# Patient Record
Sex: FEMALE | Race: WHITE | NOT HISPANIC OR LATINO | Employment: OTHER | URBAN - METROPOLITAN AREA
[De-identification: names, ages, dates, MRNs, and addresses within clinical notes are randomized per-mention and may not be internally consistent; named-entity substitution may affect disease eponyms.]

---

## 2017-09-21 ENCOUNTER — HOSPITAL ENCOUNTER (OUTPATIENT)
Dept: RADIOLOGY | Facility: MEDICAL CENTER | Age: 62
End: 2017-09-21
Attending: FAMILY MEDICINE
Payer: COMMERCIAL

## 2017-09-21 DIAGNOSIS — Z12.31 ENCOUNTER FOR SCREENING MAMMOGRAM FOR MALIGNANT NEOPLASM OF BREAST: ICD-10-CM

## 2017-09-21 PROCEDURE — G0202 SCR MAMMO BI INCL CAD: HCPCS

## 2019-04-16 ENCOUNTER — HOSPITAL ENCOUNTER (OUTPATIENT)
Dept: LAB | Facility: MEDICAL CENTER | Age: 64
End: 2019-04-16
Attending: FAMILY MEDICINE
Payer: COMMERCIAL

## 2019-04-16 LAB
ALBUMIN SERPL BCP-MCNC: 4.4 G/DL (ref 3.2–4.9)
ALBUMIN/GLOB SERPL: 1.6 G/DL
ALP SERPL-CCNC: 67 U/L (ref 30–99)
ALT SERPL-CCNC: 29 U/L (ref 2–50)
ANION GAP SERPL CALC-SCNC: 7 MMOL/L (ref 0–11.9)
AST SERPL-CCNC: 22 U/L (ref 12–45)
BILIRUB SERPL-MCNC: 0.8 MG/DL (ref 0.1–1.5)
BUN SERPL-MCNC: 10 MG/DL (ref 8–22)
CALCIUM SERPL-MCNC: 9.5 MG/DL (ref 8.5–10.5)
CHLORIDE SERPL-SCNC: 104 MMOL/L (ref 96–112)
CHOLEST SERPL-MCNC: 141 MG/DL (ref 100–199)
CO2 SERPL-SCNC: 29 MMOL/L (ref 20–33)
CREAT SERPL-MCNC: 0.72 MG/DL (ref 0.5–1.4)
FASTING STATUS PATIENT QL REPORTED: NORMAL
GLOBULIN SER CALC-MCNC: 2.7 G/DL (ref 1.9–3.5)
GLUCOSE SERPL-MCNC: 116 MG/DL (ref 65–99)
HDLC SERPL-MCNC: 51 MG/DL
LDLC SERPL CALC-MCNC: 66 MG/DL
POTASSIUM SERPL-SCNC: 4.1 MMOL/L (ref 3.6–5.5)
PROT SERPL-MCNC: 7.1 G/DL (ref 6–8.2)
SODIUM SERPL-SCNC: 140 MMOL/L (ref 135–145)
TRIGL SERPL-MCNC: 122 MG/DL (ref 0–149)

## 2019-04-16 PROCEDURE — 80061 LIPID PANEL: CPT

## 2019-04-16 PROCEDURE — 83036 HEMOGLOBIN GLYCOSYLATED A1C: CPT

## 2019-04-16 PROCEDURE — 36415 COLL VENOUS BLD VENIPUNCTURE: CPT

## 2019-04-16 PROCEDURE — 80053 COMPREHEN METABOLIC PANEL: CPT

## 2019-04-17 LAB
EST. AVERAGE GLUCOSE BLD GHB EST-MCNC: 128 MG/DL
HBA1C MFR BLD: 6.1 % (ref 0–5.6)

## 2020-05-13 ENCOUNTER — HOSPITAL ENCOUNTER (OUTPATIENT)
Dept: LAB | Facility: MEDICAL CENTER | Age: 65
End: 2020-05-13
Attending: FAMILY MEDICINE
Payer: COMMERCIAL

## 2020-05-13 LAB
ALBUMIN SERPL BCP-MCNC: 4.4 G/DL (ref 3.2–4.9)
ALBUMIN/GLOB SERPL: 1.6 G/DL
ALP SERPL-CCNC: 76 U/L (ref 30–99)
ALT SERPL-CCNC: 24 U/L (ref 2–50)
ANION GAP SERPL CALC-SCNC: 10 MMOL/L (ref 7–16)
AST SERPL-CCNC: 24 U/L (ref 12–45)
BILIRUB SERPL-MCNC: 0.4 MG/DL (ref 0.1–1.5)
BUN SERPL-MCNC: 13 MG/DL (ref 8–22)
CALCIUM SERPL-MCNC: 9.9 MG/DL (ref 8.5–10.5)
CHLORIDE SERPL-SCNC: 101 MMOL/L (ref 96–112)
CHOLEST SERPL-MCNC: 133 MG/DL (ref 100–199)
CO2 SERPL-SCNC: 26 MMOL/L (ref 20–33)
CREAT SERPL-MCNC: 0.7 MG/DL (ref 0.5–1.4)
FASTING STATUS PATIENT QL REPORTED: NORMAL
GLOBULIN SER CALC-MCNC: 2.8 G/DL (ref 1.9–3.5)
GLUCOSE SERPL-MCNC: 124 MG/DL (ref 65–99)
HDLC SERPL-MCNC: 58 MG/DL
LDLC SERPL CALC-MCNC: 57 MG/DL
POTASSIUM SERPL-SCNC: 4.4 MMOL/L (ref 3.6–5.5)
PROT SERPL-MCNC: 7.2 G/DL (ref 6–8.2)
SODIUM SERPL-SCNC: 137 MMOL/L (ref 135–145)
TRIGL SERPL-MCNC: 88 MG/DL (ref 0–149)

## 2020-05-13 PROCEDURE — 36415 COLL VENOUS BLD VENIPUNCTURE: CPT

## 2020-05-13 PROCEDURE — 80061 LIPID PANEL: CPT

## 2020-05-13 PROCEDURE — 83036 HEMOGLOBIN GLYCOSYLATED A1C: CPT

## 2020-05-13 PROCEDURE — 80053 COMPREHEN METABOLIC PANEL: CPT

## 2020-05-14 LAB
EST. AVERAGE GLUCOSE BLD GHB EST-MCNC: 117 MG/DL
HBA1C MFR BLD: 5.7 % (ref 0–5.6)

## 2020-06-19 ENCOUNTER — OFFICE VISIT (OUTPATIENT)
Dept: MEDICAL GROUP | Facility: PHYSICIAN GROUP | Age: 65
End: 2020-06-19
Payer: COMMERCIAL

## 2020-06-19 VITALS
RESPIRATION RATE: 16 BRPM | DIASTOLIC BLOOD PRESSURE: 80 MMHG | OXYGEN SATURATION: 97 % | WEIGHT: 159.4 LBS | HEART RATE: 74 BPM | TEMPERATURE: 98.8 F | HEIGHT: 64 IN | SYSTOLIC BLOOD PRESSURE: 126 MMHG | BODY MASS INDEX: 27.21 KG/M2

## 2020-06-19 DIAGNOSIS — R73.03 PREDIABETES: ICD-10-CM

## 2020-06-19 DIAGNOSIS — E78.2 MIXED HYPERLIPIDEMIA: ICD-10-CM

## 2020-06-19 PROCEDURE — 99204 OFFICE O/P NEW MOD 45 MIN: CPT | Performed by: FAMILY MEDICINE

## 2020-06-19 RX ORDER — ATORVASTATIN CALCIUM 20 MG/1
TABLET, FILM COATED ORAL
COMMUNITY
Start: 2020-04-29 | End: 2021-04-05 | Stop reason: SDUPTHER

## 2020-06-19 ASSESSMENT — PATIENT HEALTH QUESTIONNAIRE - PHQ9: CLINICAL INTERPRETATION OF PHQ2 SCORE: 0

## 2020-06-19 NOTE — ASSESSMENT & PLAN NOTE
This is a chronic problem  The patient has a hx of this. Last A1c was 5/20 at 5.7.   Lifestyle change is reinforced.

## 2020-06-19 NOTE — PROGRESS NOTES
Subjective:     CC:  Diagnoses of Mixed hyperlipidemia and Prediabetes were pertinent to this visit.    HISTORY OF THE PRESENT ILLNESS: Patient is a 65 y.o. female. This pleasant patient is here today to establish care and discuss the following problems.   Prior PCP: Unknown.    Mixed hyperlipidemia  This is a chronic condition.    Symptom onset: The patient has a hx of HLD lifelong  Current symptoms: None.  Since onset symptoms are: Unchanged  Modifying factors: Has been on statin for 5 years. Last time she completed lipid panel was   Treatments tried: Atorvastatin 20mg  Associated symptoms: None      Prediabetes  This is a chronic problem  The patient has a hx of this. Last A1c was  at 5.7.   Lifestyle change is reinforced.       Allergies: Patient has no known allergies.    Current Outpatient Medications Ordered in Epic   Medication Sig Dispense Refill   • atorvastatin (LIPITOR) 20 MG Tab        No current Epic-ordered facility-administered medications on file.        Past Medical History:   Diagnosis Date   • Prediabetes        History reviewed. No pertinent surgical history.    Social History     Tobacco Use   • Smoking status: Former Smoker     Last attempt to quit:      Years since quittin.4   • Smokeless tobacco: Never Used   Substance Use Topics   • Alcohol use: Not Currently   • Drug use: Never       Social History     Social History Narrative   • Not on file       Family History   Problem Relation Age of Onset   • Diabetes Mother    • Hyperlipidemia Mother    • Hyperlipidemia Father        Health Maintenance: Completed    ROS:   Gen: no fevers/chills, no changes in weight  Eyes: no changes in vision  ENT: no sore throat, no hearing loss, no bloody nose  Pulm: no sob, no cough  CV: no chest pain, no palpitations  GI: no nausea/vomiting, no diarrhea  : no dysuria  MSk: no myalgias  Skin: no rash  Neuro: no headaches, no numbness/tingling  Heme/Lymph: no easy bruising      Objective:  "    Exam: /80 (BP Location: Left arm, Patient Position: Sitting, BP Cuff Size: Adult)   Pulse 74   Temp 37.1 °C (98.8 °F) (Temporal)   Resp 16   Ht 1.626 m (5' 4\")   Wt 72.3 kg (159 lb 6.4 oz)   SpO2 97%  Body mass index is 27.36 kg/m².    General: Normal appearing. No distress.  HENT: Normocephalic. Ears normal shape and contour, canals are clear bilaterally, tympanic membranes are benign, nasal mucosa benign, oropharynx is without erythema, edema or exudates.   Eyes: Conjunctiva clear lids without ptosis, pupils equal and reactive to light accommodation.  Neck: Supple without JVD. Thyroid is not enlarged.  Pulmonary: Clear to ausculation.  Normal effort. No rales, ronchi, or wheezing.  Cardiovascular: Regular rate and rhythm without murmur. Radial pulses are intact and equal bilaterally.  Abdomen: Soft, nontender, nondistended. Normal bowel sounds. Liver and spleen are not palpable  Neurologic: Moves all extremities  Lymph: No cervical or supraclavicular lymph nodes are palpable  Skin: Warm and dry.  No obvious lesions.  Musculoskeletal: Normal gait. No extremity cyanosis, clubbing, or edema.  Psych: Normal mood and affect. Alert and oriented x3. Judgment and insight is normal.      Assessment & Plan:   65 y.o. female with the following -    1. Mixed hyperlipidemia  This is a chronic condition.  It is well controlled with atorvastatin 20 mg daily.  Last lipid panel was May 2020 with well-controlled values.  At this time she is reinforcing lifestyle change.  We will continue with current regimen.  No refills are requested today.  -Repeat lipid panel May 2021    2. Prediabetes  This is a chronic condition.  She has a history of well-controlled prediabetes.  Last A1c was May 2020 at 5.7.  When shown above she is reinforcing lifestyle change and exercises on a daily basis.  We plan on repeating A1c in 6 months from now.  - HEMOGLOBIN A1C; Standing      Return in about 6 months (around 12/19/2020) for FU " preDM.    Please note that this dictation was created using voice recognition software. I have made every reasonable attempt to correct obvious errors, but I expect that there are errors of grammar and possibly content that I did not discover before finalizing the note.

## 2020-06-19 NOTE — ASSESSMENT & PLAN NOTE
This is a chronic condition.    Symptom onset: The patient has a hx of HLD lifelong  Current symptoms: None.  Since onset symptoms are: Unchanged  Modifying factors: Has been on statin for 5 years. Last time she completed lipid panel was 5/20  Treatments tried: Atorvastatin 20mg  Associated symptoms: None

## 2020-12-21 ENCOUNTER — HOSPITAL ENCOUNTER (OUTPATIENT)
Dept: LAB | Facility: MEDICAL CENTER | Age: 65
End: 2020-12-21
Attending: FAMILY MEDICINE
Payer: COMMERCIAL

## 2020-12-21 DIAGNOSIS — R73.03 PREDIABETES: ICD-10-CM

## 2020-12-21 PROCEDURE — 83036 HEMOGLOBIN GLYCOSYLATED A1C: CPT

## 2020-12-21 PROCEDURE — 36415 COLL VENOUS BLD VENIPUNCTURE: CPT

## 2020-12-22 LAB
EST. AVERAGE GLUCOSE BLD GHB EST-MCNC: 117 MG/DL
HBA1C MFR BLD: 5.7 % (ref 0–5.6)

## 2021-03-15 DIAGNOSIS — Z23 NEED FOR VACCINATION: ICD-10-CM

## 2021-04-05 RX ORDER — ATORVASTATIN CALCIUM 20 MG/1
20 TABLET, FILM COATED ORAL DAILY
Qty: 90 TABLET | Refills: 3 | Status: SHIPPED | OUTPATIENT
Start: 2021-04-05 | End: 2022-03-21

## 2021-04-05 NOTE — TELEPHONE ENCOUNTER
Received request via: Patient    Was the patient seen in the last year in this department? Yes    Does the patient have an active prescription (recently filled or refills available) for mepdication(s) requested? No   Patient changed pharmacy

## 2021-05-27 ENCOUNTER — TELEMEDICINE (OUTPATIENT)
Dept: MEDICAL GROUP | Facility: PHYSICIAN GROUP | Age: 66
End: 2021-05-27
Payer: MEDICARE

## 2021-05-27 VITALS
HEIGHT: 64 IN | BODY MASS INDEX: 25.61 KG/M2 | WEIGHT: 150 LBS | SYSTOLIC BLOOD PRESSURE: 139 MMHG | DIASTOLIC BLOOD PRESSURE: 64 MMHG | HEART RATE: 72 BPM

## 2021-05-27 DIAGNOSIS — Z12.31 ENCOUNTER FOR SCREENING MAMMOGRAM FOR BREAST CANCER: ICD-10-CM

## 2021-05-27 DIAGNOSIS — R73.03 PREDIABETES: ICD-10-CM

## 2021-05-27 DIAGNOSIS — E78.2 MIXED HYPERLIPIDEMIA: ICD-10-CM

## 2021-05-27 PROCEDURE — 99214 OFFICE O/P EST MOD 30 MIN: CPT | Mod: 95,CR | Performed by: FAMILY MEDICINE

## 2021-05-27 ASSESSMENT — PATIENT HEALTH QUESTIONNAIRE - PHQ9: CLINICAL INTERPRETATION OF PHQ2 SCORE: 0

## 2021-05-27 NOTE — PROGRESS NOTES
"Virtual Visit: Established Patient   This visit was conducted via Zoom using secure and encrypted videoconferencing technology. The patient was in a private location in the state of [place].    The patient's identity was confirmed and verbal consent was obtained for this virtual visit.    Subjective:   CC:   Chief Complaint   Patient presents with   • Diabetes Follow-up       Tara Antunez is a 66 y.o. female presenting for evaluation and management of:    #PreDM  Chronic issue  A1c was 5.7 last checked 5 months ago  She continues to make lifestyle changes  Due for repeat labs    #HLD  Chronic issue  Due for repeat labs  On lipitor 20mg daily       ROS   Denies any recent fevers or chills. No nausea or vomiting. No chest pains or shortness of breath.     No Known Allergies    Current medicines (including changes today)  Current Outpatient Medications   Medication Sig Dispense Refill   • atorvastatin (LIPITOR) 20 MG Tab Take 1 tablet by mouth every day for 90 days. 90 tablet 3     No current facility-administered medications for this visit.       Patient Active Problem List    Diagnosis Date Noted   • Mixed hyperlipidemia 06/19/2020   • Prediabetes 06/19/2020       Family History   Problem Relation Age of Onset   • Diabetes Mother    • Hyperlipidemia Mother    • Hyperlipidemia Father        She  has a past medical history of Prediabetes.  She  has no past surgical history on file.       Objective:   /64 (BP Location: Left arm, Patient Position: Sitting, BP Cuff Size: Adult) Comment: pt reported  Pulse 72 Comment: pt reported  Ht 1.626 m (5' 4\") Comment: pt reported  Wt 68 kg (150 lb) Comment: pt reported  BMI 25.75 kg/m²     Physical Exam:  Constitutional: Alert, no distress, well-groomed.  Skin: No rashes in visible areas.  Eye: Round. Conjunctiva clear, lids normal. No icterus.   ENMT: Lips pink without lesions, good dentition, moist mucous membranes. Phonation normal.  Neck: No masses, no " thyromegaly. Moves freely without pain.  Respiratory: Unlabored respiratory effort, no cough or audible wheeze  Psych: Alert and oriented x3, normal affect and mood.       Assessment and Plan:   The following treatment plan was discussed:     1. Prediabetes  Chronic, stable condition.  Due for repeat labs next month.  Continue with lifestyle change.  - HEMOGLOBIN A1C; Future    2. Mixed hyperlipidemia  Chronic, stable condition.  Due for repeat labs.  Continue with atorvastatin 20 mg daily.  - Lipid Profile; Future    3. Encounter for screening mammogram for breast cancer  - MA-SCREENING MAMMO BILAT W/TOMOSYNTHESIS W/CAD; Future        Follow-up: Return in about 6 months (around 11/27/2021).

## 2021-07-09 ENCOUNTER — HOSPITAL ENCOUNTER (OUTPATIENT)
Dept: LAB | Facility: MEDICAL CENTER | Age: 66
End: 2021-07-09
Attending: FAMILY MEDICINE
Payer: MEDICARE

## 2021-07-09 DIAGNOSIS — E78.2 MIXED HYPERLIPIDEMIA: ICD-10-CM

## 2021-07-09 DIAGNOSIS — R73.03 PREDIABETES: ICD-10-CM

## 2021-07-09 LAB
CHOLEST SERPL-MCNC: 124 MG/DL (ref 100–199)
EST. AVERAGE GLUCOSE BLD GHB EST-MCNC: 126 MG/DL
HBA1C MFR BLD: 6 % (ref 4–5.6)
HDLC SERPL-MCNC: 54 MG/DL
LDLC SERPL CALC-MCNC: 51 MG/DL
TRIGL SERPL-MCNC: 94 MG/DL (ref 0–149)

## 2021-07-09 PROCEDURE — 80061 LIPID PANEL: CPT

## 2021-07-09 PROCEDURE — 36415 COLL VENOUS BLD VENIPUNCTURE: CPT

## 2021-07-09 PROCEDURE — 83036 HEMOGLOBIN GLYCOSYLATED A1C: CPT | Mod: GA

## 2021-09-24 ENCOUNTER — TELEPHONE (OUTPATIENT)
Dept: MEDICAL GROUP | Facility: PHYSICIAN GROUP | Age: 66
End: 2021-09-24

## 2021-09-24 NOTE — TELEPHONE ENCOUNTER
VOICEMAIL  1. Caller Name: Melvin Antunez                            Call Back Number: [phone]      2. Message: Patients  LVM concerned that wife's appointment needs to be changed to an annual and not a A1C follow up. Patients  has already been speaking with billing and need provider to Change appoinments to Annuals.    Please advise.    3. Patient approves office to leave a detailed voicemail/MyChart message: N\A

## 2021-10-07 NOTE — TELEPHONE ENCOUNTER
Called and spoke with patients  in regards to billing issue.   Patient was very pleasant and expressed that this visit should have been scheduled as an annual visit (which is covered through insurance).   I explained to patient that we did not have it as an annual and did not do our medicare wellness questions during the visit per medicare guidelines.   Upon looking into the appointment, it looks like it was initially scheduled as a 6 month FV from the previous appt on 5/29/20. PCP requested they return in 6 months. However, patients called to move appointment to annual via our scheduling department. Unfortunately, our scheduling department did not update the appointment notes to indicate it was an annual. Therefore the visits were conducted as just follow ups on conditions. Patient did express frustration with the telemedicine visits and would like to not do those in the future. Patient will be responsible for remaining portion towards their deductible. Additionally gave patients my contact information if anything further arises.   Nothing further at this time.

## 2022-03-21 RX ORDER — ATORVASTATIN CALCIUM 20 MG/1
TABLET, FILM COATED ORAL
Qty: 90 TABLET | Refills: 0 | Status: SHIPPED | OUTPATIENT
Start: 2022-03-21 | End: 2022-06-17

## 2022-04-22 SDOH — ECONOMIC STABILITY: INCOME INSECURITY: HOW HARD IS IT FOR YOU TO PAY FOR THE VERY BASICS LIKE FOOD, HOUSING, MEDICAL CARE, AND HEATING?: NOT HARD AT ALL

## 2022-04-22 SDOH — HEALTH STABILITY: PHYSICAL HEALTH: ON AVERAGE, HOW MANY DAYS PER WEEK DO YOU ENGAGE IN MODERATE TO STRENUOUS EXERCISE (LIKE A BRISK WALK)?: 7 DAYS

## 2022-04-22 SDOH — ECONOMIC STABILITY: TRANSPORTATION INSECURITY
IN THE PAST 12 MONTHS, HAS THE LACK OF TRANSPORTATION KEPT YOU FROM MEDICAL APPOINTMENTS OR FROM GETTING MEDICATIONS?: NO

## 2022-04-22 SDOH — HEALTH STABILITY: PHYSICAL HEALTH: ON AVERAGE, HOW MANY MINUTES DO YOU ENGAGE IN EXERCISE AT THIS LEVEL?: 90 MIN

## 2022-04-22 SDOH — ECONOMIC STABILITY: FOOD INSECURITY: WITHIN THE PAST 12 MONTHS, YOU WORRIED THAT YOUR FOOD WOULD RUN OUT BEFORE YOU GOT MONEY TO BUY MORE.: NEVER TRUE

## 2022-04-22 SDOH — ECONOMIC STABILITY: INCOME INSECURITY: IN THE LAST 12 MONTHS, WAS THERE A TIME WHEN YOU WERE NOT ABLE TO PAY THE MORTGAGE OR RENT ON TIME?: NO

## 2022-04-22 SDOH — ECONOMIC STABILITY: HOUSING INSECURITY
IN THE LAST 12 MONTHS, WAS THERE A TIME WHEN YOU DID NOT HAVE A STEADY PLACE TO SLEEP OR SLEPT IN A SHELTER (INCLUDING NOW)?: NO

## 2022-04-22 SDOH — HEALTH STABILITY: MENTAL HEALTH
STRESS IS WHEN SOMEONE FEELS TENSE, NERVOUS, ANXIOUS, OR CAN'T SLEEP AT NIGHT BECAUSE THEIR MIND IS TROUBLED. HOW STRESSED ARE YOU?: ONLY A LITTLE

## 2022-04-22 SDOH — ECONOMIC STABILITY: TRANSPORTATION INSECURITY
IN THE PAST 12 MONTHS, HAS LACK OF TRANSPORTATION KEPT YOU FROM MEETINGS, WORK, OR FROM GETTING THINGS NEEDED FOR DAILY LIVING?: NO

## 2022-04-22 SDOH — ECONOMIC STABILITY: HOUSING INSECURITY: IN THE LAST 12 MONTHS, HOW MANY PLACES HAVE YOU LIVED?: 1

## 2022-04-22 SDOH — ECONOMIC STABILITY: FOOD INSECURITY: WITHIN THE PAST 12 MONTHS, THE FOOD YOU BOUGHT JUST DIDN'T LAST AND YOU DIDN'T HAVE MONEY TO GET MORE.: NEVER TRUE

## 2022-04-22 SDOH — ECONOMIC STABILITY: TRANSPORTATION INSECURITY
IN THE PAST 12 MONTHS, HAS LACK OF RELIABLE TRANSPORTATION KEPT YOU FROM MEDICAL APPOINTMENTS, MEETINGS, WORK OR FROM GETTING THINGS NEEDED FOR DAILY LIVING?: NO

## 2022-04-22 ASSESSMENT — SOCIAL DETERMINANTS OF HEALTH (SDOH)
HOW OFTEN DO YOU ATTENT MEETINGS OF THE CLUB OR ORGANIZATION YOU BELONG TO?: NEVER
HOW OFTEN DO YOU GET TOGETHER WITH FRIENDS OR RELATIVES?: ONCE A WEEK
HOW HARD IS IT FOR YOU TO PAY FOR THE VERY BASICS LIKE FOOD, HOUSING, MEDICAL CARE, AND HEATING?: NOT HARD AT ALL
HOW OFTEN DO YOU ATTENT MEETINGS OF THE CLUB OR ORGANIZATION YOU BELONG TO?: NEVER
HOW MANY DRINKS CONTAINING ALCOHOL DO YOU HAVE ON A TYPICAL DAY WHEN YOU ARE DRINKING: 1 OR 2
HOW OFTEN DO YOU ATTEND CHURCH OR RELIGIOUS SERVICES?: NEVER
IN A TYPICAL WEEK, HOW MANY TIMES DO YOU TALK ON THE PHONE WITH FAMILY, FRIENDS, OR NEIGHBORS?: MORE THAN THREE TIMES A WEEK
HOW OFTEN DO YOU HAVE SIX OR MORE DRINKS ON ONE OCCASION: NEVER
HOW OFTEN DO YOU ATTEND CHURCH OR RELIGIOUS SERVICES?: NEVER
HOW OFTEN DO YOU GET TOGETHER WITH FRIENDS OR RELATIVES?: ONCE A WEEK
HOW OFTEN DO YOU HAVE A DRINK CONTAINING ALCOHOL: MONTHLY OR LESS
DO YOU BELONG TO ANY CLUBS OR ORGANIZATIONS SUCH AS CHURCH GROUPS UNIONS, FRATERNAL OR ATHLETIC GROUPS, OR SCHOOL GROUPS?: NO
IN A TYPICAL WEEK, HOW MANY TIMES DO YOU TALK ON THE PHONE WITH FAMILY, FRIENDS, OR NEIGHBORS?: MORE THAN THREE TIMES A WEEK
WITHIN THE PAST 12 MONTHS, YOU WORRIED THAT YOUR FOOD WOULD RUN OUT BEFORE YOU GOT THE MONEY TO BUY MORE: NEVER TRUE
DO YOU BELONG TO ANY CLUBS OR ORGANIZATIONS SUCH AS CHURCH GROUPS UNIONS, FRATERNAL OR ATHLETIC GROUPS, OR SCHOOL GROUPS?: NO

## 2022-04-22 ASSESSMENT — LIFESTYLE VARIABLES
HOW OFTEN DO YOU HAVE SIX OR MORE DRINKS ON ONE OCCASION: NEVER
HOW MANY STANDARD DRINKS CONTAINING ALCOHOL DO YOU HAVE ON A TYPICAL DAY: 1 OR 2
HOW OFTEN DO YOU HAVE A DRINK CONTAINING ALCOHOL: MONTHLY OR LESS

## 2022-04-25 ENCOUNTER — HOSPITAL ENCOUNTER (OUTPATIENT)
Dept: LAB | Facility: MEDICAL CENTER | Age: 67
End: 2022-04-25
Attending: FAMILY MEDICINE
Payer: MEDICARE

## 2022-04-25 ENCOUNTER — OFFICE VISIT (OUTPATIENT)
Dept: MEDICAL GROUP | Facility: PHYSICIAN GROUP | Age: 67
End: 2022-04-25
Payer: MEDICARE

## 2022-04-25 VITALS
HEIGHT: 64 IN | SYSTOLIC BLOOD PRESSURE: 132 MMHG | BODY MASS INDEX: 27.14 KG/M2 | TEMPERATURE: 99 F | DIASTOLIC BLOOD PRESSURE: 78 MMHG | WEIGHT: 159 LBS | HEART RATE: 87 BPM | OXYGEN SATURATION: 94 %

## 2022-04-25 DIAGNOSIS — E78.2 MIXED HYPERLIPIDEMIA: ICD-10-CM

## 2022-04-25 DIAGNOSIS — R73.03 PREDIABETES: ICD-10-CM

## 2022-04-25 DIAGNOSIS — Z00.00 MEDICARE ANNUAL WELLNESS VISIT, INITIAL: Primary | ICD-10-CM

## 2022-04-25 PROCEDURE — G0402 INITIAL PREVENTIVE EXAM: HCPCS | Performed by: FAMILY MEDICINE

## 2022-04-25 PROCEDURE — 36415 COLL VENOUS BLD VENIPUNCTURE: CPT | Mod: GA

## 2022-04-25 PROCEDURE — 83036 HEMOGLOBIN GLYCOSYLATED A1C: CPT | Mod: GA

## 2022-04-25 ASSESSMENT — ACTIVITIES OF DAILY LIVING (ADL): BATHING_REQUIRES_ASSISTANCE: 0

## 2022-04-25 ASSESSMENT — PATIENT HEALTH QUESTIONNAIRE - PHQ9: CLINICAL INTERPRETATION OF PHQ2 SCORE: 0

## 2022-04-25 ASSESSMENT — ENCOUNTER SYMPTOMS: GENERAL WELL-BEING: EXCELLENT

## 2022-04-25 NOTE — PROGRESS NOTES
Chief Complaint   Patient presents with   • Annual Wellness Visit         HPI:  Tara is a 66 y.o. here for Medicare Annual Wellness Visit    Yes    Patient Active Problem List    Diagnosis Date Noted   • Mixed hyperlipidemia 06/19/2020   • Prediabetes 06/19/2020       Current Outpatient Medications   Medication Sig Dispense Refill   • atorvastatin (LIPITOR) 20 MG Tab TAKE 1 TABLET DAILY 90 Tablet 0     No current facility-administered medications for this visit.        Patient is taking medications as noted in medication list.  Current supplements as per medication list.     Allergies: Patient has no known allergies.    Current social contact/activities: Yes  Goes out with friends.    Is patient current with immunizations? Yes.    She  reports that she quit smoking about 38 years ago. She has never used smokeless tobacco. She reports previous alcohol use. She reports that she does not use drugs.  Counseling given: Not Answered        DPA/Advanced directive: Patient has Advanced Directive, but it is not on file. Instructed to bring in a copy to scan into their chart.    ROS:    Gait: Uses no assistive device   Ostomy: No   Other tubes: No   Amputations: No   Chronic oxygen use No   Last eye exam last feb2021   Wears hearing aids: No   : Reports urinary leakage during the last 6 months that has not interfered at all with their daily activities or sleep.      Screening:      Depression Screening  Little interest or pleasure in doing things?  0 - not at all  Feeling down, depressed, or hopeless? 0 - not at all  Patient Health Questionnaire Score: 0    If depressive symptoms identified deferred to follow up visit unless specifically addressed in assessment and plan.    Interpretation of PHQ-9 Total Score   Score Severity   1-4 No Depression   5-9 Mild Depression   10-14 Moderate Depression   15-19 Moderately Severe Depression   20-27 Severe Depression    Screening for Cognitive Impairment  Three Minute Recall  (daughter, joe, mountain)  3/3    Suman clock face with all 12 numbers and set the hands to show 10 past 11.  Yes    If cognitive concerns identified, deferred for follow up unless specifically addressed in assessment and plan.    Fall Risk Assessment  Has the patient had two or more falls in the last year or any fall with injury in the last year?  Yes  If fall risk identified, deferred for follow up unless specifically addressed in assessment and plan.    Safety Assessment  Throw rugs on floor.  Yes  Handrails on all stairs.  Yes  Good lighting in all hallways.  Yes  Difficulty hearing.  No  Patient counseled about all safety risks that were identified.    Functional Assessment ADLs  Are there any barriers preventing you from cooking for yourself or meeting nutritional needs?  No.    Are there any barriers preventing you from driving safely or obtaining transportation?  No.    Are there any barriers preventing you from using a telephone or calling for help?  No.    Are there any barriers preventing you from shopping?  No.    Are there any barriers preventing you from taking care of your own finances?  No.    Are there any barriers preventing you from managing your medications?  No.    Are there any barriers preventing you from showering, bathing or dressing yourself?  No.    Are you currently engaging in any exercise or physical activity?  Yes.  Walks dogs three miles  What is your perception of your health?  Excellent.    Health Maintenance Summary          Overdue - HEPATITIS C SCREENING (Once) Overdue - never done    No completion history exists for this topic.          Overdue - PAP SMEAR (Every 3 Years) Overdue - never done    No completion history exists for this topic.          Overdue - IMM ZOSTER VACCINES (2 of 3) Overdue since 8/10/2016    06/15/2016  Imm Admin: Zoster Vaccine Live (ZVL) (Zostavax) - HISTORICAL DATA          Ordered - MAMMOGRAM (Yearly) Ordered on 5/27/2021 09/21/2017  MA-SCREEN  MAMMO W/CAD-BILAT          Overdue - BONE DENSITY (Every 5 Years) Overdue - never done    No completion history exists for this topic.          IMM PNEUMOCOCCAL VACCINE: 65+ Years (2 - PCV) Next due on 2021  Imm Admin: Pneumococcal polysaccharide vaccine (PPSV-23)          COLORECTAL CANCER SCREENING (COLONOSCOPY - Every 10 Years) Next due on 2015  COLONOSCOPY (Done)          IMM DTaP/Tdap/Td Vaccine (2 - Td or Tdap) Next due on 6/15/2026    06/15/2016  Imm Admin: Tdap Vaccine          IMM INFLUENZA (Series Information) Completed    2021  Imm Admin: Influenza, Unspecified - HISTORICAL DATA    2020  Imm Admin: Influenza Vac Subunit Quad Inj (Pf)    10/07/2019  Imm Admin: Influenza Vaccine Quad Inj (Pf)    10/01/2018  Imm Admin: Influenza Vaccine Quad Inj (Preserved)    10/02/2017  Imm Admin: Influenza Vaccine Quad Inj (Pf)    Only the first 5 history entries have been loaded, but more history exists.          COVID-19 Vaccine (Series Information) Completed    2021  Imm Admin: Moderna SARS-CoV-2 Vaccine    2021  Imm Admin: Pfizer SARS-CoV-2 Vaccine 12+    2021  Imm Admin: Pfizer SARS-CoV-2 Vaccine 12+          Annual Wellness Visit  Completed    2022  Visit Dx: Medicare annual wellness visit, initial    2022  Level of Service: INITIAL ANNUAL WELLNESS VISIT-INCLUDES PPPS          IMM HEP B VACCINE (Series Information) Aged Out    No completion history exists for this topic.          IMM MENINGOCOCCAL VACCINE (MCV4) (Series Information) Aged Out    No completion history exists for this topic.                Patient Care Team:  Jimmy Miller M.D. as PCP - General (Family Medicine)    Social History     Tobacco Use   • Smoking status: Former Smoker     Quit date: 1984     Years since quittin.3   • Smokeless tobacco: Never Used   Vaping Use   • Vaping Use: Never used   Substance Use Topics   • Alcohol use: Not Currently   • Drug  "use: Never     Family History   Problem Relation Age of Onset   • Diabetes Mother    • Hyperlipidemia Mother    • Hyperlipidemia Father      She  has a past medical history of Prediabetes.   No past surgical history on file.        Exam:     /78 (BP Location: Right arm, Patient Position: Sitting, BP Cuff Size: Adult)   Pulse 87   Temp 37.2 °C (99 °F) (Temporal)   Ht 1.626 m (5' 4\")   Wt 72.1 kg (159 lb)   SpO2 94%  Body mass index is 27.29 kg/m².    Hearing excellent.    Dentition good  Alert, oriented in no acute distress.  Eye contact is good, speech goal directed, affect calm      Assessment and Plan. The following treatment and monitoring plan is recommended:    1. Medicare annual wellness visit, initial     2. Prediabetes  HEMOGLOBIN A1C   3. Mixed hyperlipidemia           Services suggested: No services needed at this time  Health Care Screening recommendations as per orders if indicated.  Referrals offered: PT/OT/Nutrition counseling/Behavioral Health/Smoking cessation as per orders if indicated.    Discussion today about general wellness and lifestyle habits:    · Prevent falls and reduce trip hazards; Cautioned about securing or removing rugs.  · Have a working fire alarm and carbon monoxide detector;   · Engage in regular physical activity and social activities       Follow-up: No follow-ups on file.  "

## 2022-04-26 LAB
EST. AVERAGE GLUCOSE BLD GHB EST-MCNC: 111 MG/DL
HBA1C MFR BLD: 5.5 % (ref 4–5.6)

## 2022-06-17 RX ORDER — ATORVASTATIN CALCIUM 20 MG/1
TABLET, FILM COATED ORAL
Qty: 90 TABLET | Refills: 0 | Status: SHIPPED | OUTPATIENT
Start: 2022-06-17 | End: 2022-09-08

## 2022-06-30 ENCOUNTER — PATIENT MESSAGE (OUTPATIENT)
Dept: MEDICAL GROUP | Facility: PHYSICIAN GROUP | Age: 67
End: 2022-06-30
Payer: MEDICARE

## 2022-06-30 DIAGNOSIS — R73.03 PREDIABETES: ICD-10-CM

## 2022-06-30 DIAGNOSIS — E78.2 MIXED HYPERLIPIDEMIA: ICD-10-CM

## 2022-07-12 ENCOUNTER — HOSPITAL ENCOUNTER (OUTPATIENT)
Dept: LAB | Facility: MEDICAL CENTER | Age: 67
End: 2022-07-12
Attending: FAMILY MEDICINE
Payer: MEDICARE

## 2022-07-12 DIAGNOSIS — R73.03 PREDIABETES: ICD-10-CM

## 2022-07-12 DIAGNOSIS — E78.2 MIXED HYPERLIPIDEMIA: ICD-10-CM

## 2022-07-12 LAB
ALBUMIN SERPL BCP-MCNC: 4.6 G/DL (ref 3.2–4.9)
ALBUMIN/GLOB SERPL: 1.9 G/DL
ALP SERPL-CCNC: 61 U/L (ref 30–99)
ALT SERPL-CCNC: 21 U/L (ref 2–50)
ANION GAP SERPL CALC-SCNC: 11 MMOL/L (ref 7–16)
AST SERPL-CCNC: 18 U/L (ref 12–45)
BILIRUB SERPL-MCNC: 0.5 MG/DL (ref 0.1–1.5)
BUN SERPL-MCNC: 12 MG/DL (ref 8–22)
CALCIUM SERPL-MCNC: 9.4 MG/DL (ref 8.5–10.5)
CHLORIDE SERPL-SCNC: 105 MMOL/L (ref 96–112)
CHOLEST SERPL-MCNC: 118 MG/DL (ref 100–199)
CO2 SERPL-SCNC: 25 MMOL/L (ref 20–33)
CREAT SERPL-MCNC: 0.68 MG/DL (ref 0.5–1.4)
ERYTHROCYTE [DISTWIDTH] IN BLOOD BY AUTOMATED COUNT: 45 FL (ref 35.9–50)
FASTING STATUS PATIENT QL REPORTED: NORMAL
GFR SERPLBLD CREATININE-BSD FMLA CKD-EPI: 96 ML/MIN/1.73 M 2
GLOBULIN SER CALC-MCNC: 2.4 G/DL (ref 1.9–3.5)
GLUCOSE SERPL-MCNC: 95 MG/DL (ref 65–99)
HCT VFR BLD AUTO: 40.6 % (ref 37–47)
HDLC SERPL-MCNC: 49 MG/DL
HGB BLD-MCNC: 13.3 G/DL (ref 12–16)
LDLC SERPL CALC-MCNC: 56 MG/DL
MCH RBC QN AUTO: 29.6 PG (ref 27–33)
MCHC RBC AUTO-ENTMCNC: 32.8 G/DL (ref 33.6–35)
MCV RBC AUTO: 90.4 FL (ref 81.4–97.8)
PLATELET # BLD AUTO: 247 K/UL (ref 164–446)
PMV BLD AUTO: 10.5 FL (ref 9–12.9)
POTASSIUM SERPL-SCNC: 4 MMOL/L (ref 3.6–5.5)
PROT SERPL-MCNC: 7 G/DL (ref 6–8.2)
RBC # BLD AUTO: 4.49 M/UL (ref 4.2–5.4)
SODIUM SERPL-SCNC: 141 MMOL/L (ref 135–145)
TRIGL SERPL-MCNC: 65 MG/DL (ref 0–149)
WBC # BLD AUTO: 5.8 K/UL (ref 4.8–10.8)

## 2022-07-12 PROCEDURE — 80053 COMPREHEN METABOLIC PANEL: CPT

## 2022-07-12 PROCEDURE — 36415 COLL VENOUS BLD VENIPUNCTURE: CPT

## 2022-07-12 PROCEDURE — 85027 COMPLETE CBC AUTOMATED: CPT

## 2022-07-12 PROCEDURE — 80061 LIPID PANEL: CPT

## 2022-11-07 ENCOUNTER — PATIENT MESSAGE (OUTPATIENT)
Dept: HEALTH INFORMATION MANAGEMENT | Facility: OTHER | Age: 67
End: 2022-11-07

## 2023-05-09 PROBLEM — M18.0 OSTEOARTHRITIS OF CARPOMETACARPAL (CMC) JOINT OF BOTH THUMBS: Status: ACTIVE | Noted: 2023-05-09

## 2023-07-03 ENCOUNTER — PATIENT MESSAGE (OUTPATIENT)
Dept: MEDICAL GROUP | Facility: PHYSICIAN GROUP | Age: 68
End: 2023-07-03
Payer: MEDICARE

## 2023-07-03 DIAGNOSIS — R73.03 PREDIABETES: ICD-10-CM

## 2023-07-03 DIAGNOSIS — E78.2 MIXED HYPERLIPIDEMIA: ICD-10-CM

## 2023-07-13 ENCOUNTER — HOSPITAL ENCOUNTER (OUTPATIENT)
Dept: LAB | Facility: MEDICAL CENTER | Age: 68
End: 2023-07-13
Attending: FAMILY MEDICINE
Payer: MEDICARE

## 2023-07-13 DIAGNOSIS — E78.2 MIXED HYPERLIPIDEMIA: ICD-10-CM

## 2023-07-13 DIAGNOSIS — R73.03 PREDIABETES: ICD-10-CM

## 2023-07-13 LAB
ALBUMIN SERPL BCP-MCNC: 4.4 G/DL (ref 3.2–4.9)
ALBUMIN/GLOB SERPL: 1.8 G/DL
ALP SERPL-CCNC: 67 U/L (ref 30–99)
ALT SERPL-CCNC: 22 U/L (ref 2–50)
ANION GAP SERPL CALC-SCNC: 10 MMOL/L (ref 7–16)
AST SERPL-CCNC: 19 U/L (ref 12–45)
BILIRUB SERPL-MCNC: 0.3 MG/DL (ref 0.1–1.5)
BUN SERPL-MCNC: 13 MG/DL (ref 8–22)
CALCIUM ALBUM COR SERPL-MCNC: 9.1 MG/DL (ref 8.5–10.5)
CALCIUM SERPL-MCNC: 9.4 MG/DL (ref 8.5–10.5)
CHLORIDE SERPL-SCNC: 106 MMOL/L (ref 96–112)
CHOLEST SERPL-MCNC: 148 MG/DL (ref 100–199)
CO2 SERPL-SCNC: 28 MMOL/L (ref 20–33)
CREAT SERPL-MCNC: 0.8 MG/DL (ref 0.5–1.4)
ERYTHROCYTE [DISTWIDTH] IN BLOOD BY AUTOMATED COUNT: 47.9 FL (ref 35.9–50)
EST. AVERAGE GLUCOSE BLD GHB EST-MCNC: 117 MG/DL
FASTING STATUS PATIENT QL REPORTED: NORMAL
GFR SERPLBLD CREATININE-BSD FMLA CKD-EPI: 81 ML/MIN/1.73 M 2
GLOBULIN SER CALC-MCNC: 2.5 G/DL (ref 1.9–3.5)
GLUCOSE SERPL-MCNC: 112 MG/DL (ref 65–99)
HBA1C MFR BLD: 5.7 % (ref 4–5.6)
HCT VFR BLD AUTO: 44.4 % (ref 37–47)
HDLC SERPL-MCNC: 55 MG/DL
HGB BLD-MCNC: 13.7 G/DL (ref 12–16)
LDLC SERPL CALC-MCNC: 71 MG/DL
MCH RBC QN AUTO: 28.5 PG (ref 27–33)
MCHC RBC AUTO-ENTMCNC: 30.9 G/DL (ref 32.2–35.5)
MCV RBC AUTO: 92.5 FL (ref 81.4–97.8)
PLATELET # BLD AUTO: 271 K/UL (ref 164–446)
PMV BLD AUTO: 10.3 FL (ref 9–12.9)
POTASSIUM SERPL-SCNC: 4.1 MMOL/L (ref 3.6–5.5)
PROT SERPL-MCNC: 6.9 G/DL (ref 6–8.2)
RBC # BLD AUTO: 4.8 M/UL (ref 4.2–5.4)
SODIUM SERPL-SCNC: 144 MMOL/L (ref 135–145)
TRIGL SERPL-MCNC: 112 MG/DL (ref 0–149)
WBC # BLD AUTO: 7.2 K/UL (ref 4.8–10.8)

## 2023-07-13 PROCEDURE — 36415 COLL VENOUS BLD VENIPUNCTURE: CPT

## 2023-07-13 PROCEDURE — 80053 COMPREHEN METABOLIC PANEL: CPT

## 2023-07-13 PROCEDURE — 83036 HEMOGLOBIN GLYCOSYLATED A1C: CPT | Mod: GA

## 2023-07-13 PROCEDURE — 80061 LIPID PANEL: CPT

## 2023-07-13 PROCEDURE — 85027 COMPLETE CBC AUTOMATED: CPT

## 2023-07-22 SDOH — HEALTH STABILITY: PHYSICAL HEALTH: ON AVERAGE, HOW MANY MINUTES DO YOU ENGAGE IN EXERCISE AT THIS LEVEL?: 60 MIN

## 2023-07-22 SDOH — HEALTH STABILITY: PHYSICAL HEALTH: ON AVERAGE, HOW MANY DAYS PER WEEK DO YOU ENGAGE IN MODERATE TO STRENUOUS EXERCISE (LIKE A BRISK WALK)?: 6 DAYS

## 2023-07-22 SDOH — ECONOMIC STABILITY: FOOD INSECURITY: WITHIN THE PAST 12 MONTHS, YOU WORRIED THAT YOUR FOOD WOULD RUN OUT BEFORE YOU GOT MONEY TO BUY MORE.: NEVER TRUE

## 2023-07-22 SDOH — ECONOMIC STABILITY: FOOD INSECURITY: WITHIN THE PAST 12 MONTHS, THE FOOD YOU BOUGHT JUST DIDN'T LAST AND YOU DIDN'T HAVE MONEY TO GET MORE.: NEVER TRUE

## 2023-07-22 SDOH — HEALTH STABILITY: MENTAL HEALTH
STRESS IS WHEN SOMEONE FEELS TENSE, NERVOUS, ANXIOUS, OR CAN'T SLEEP AT NIGHT BECAUSE THEIR MIND IS TROUBLED. HOW STRESSED ARE YOU?: NOT AT ALL

## 2023-07-22 SDOH — ECONOMIC STABILITY: INCOME INSECURITY: HOW HARD IS IT FOR YOU TO PAY FOR THE VERY BASICS LIKE FOOD, HOUSING, MEDICAL CARE, AND HEATING?: NOT HARD AT ALL

## 2023-07-22 SDOH — ECONOMIC STABILITY: HOUSING INSECURITY: IN THE LAST 12 MONTHS, HOW MANY PLACES HAVE YOU LIVED?: 1

## 2023-07-22 SDOH — ECONOMIC STABILITY: INCOME INSECURITY: IN THE LAST 12 MONTHS, WAS THERE A TIME WHEN YOU WERE NOT ABLE TO PAY THE MORTGAGE OR RENT ON TIME?: NO

## 2023-07-22 ASSESSMENT — SOCIAL DETERMINANTS OF HEALTH (SDOH)
HOW OFTEN DO YOU HAVE A DRINK CONTAINING ALCOHOL: MONTHLY OR LESS
IN A TYPICAL WEEK, HOW MANY TIMES DO YOU TALK ON THE PHONE WITH FAMILY, FRIENDS, OR NEIGHBORS?: THREE TIMES A WEEK
HOW MANY DRINKS CONTAINING ALCOHOL DO YOU HAVE ON A TYPICAL DAY WHEN YOU ARE DRINKING: 1 OR 2
HOW OFTEN DO YOU ATTEND CHURCH OR RELIGIOUS SERVICES?: NEVER
HOW OFTEN DO YOU GET TOGETHER WITH FRIENDS OR RELATIVES?: ONCE A WEEK
HOW OFTEN DO YOU ATTENT MEETINGS OF THE CLUB OR ORGANIZATION YOU BELONG TO?: NEVER
HOW OFTEN DO YOU ATTEND CHURCH OR RELIGIOUS SERVICES?: NEVER
DO YOU BELONG TO ANY CLUBS OR ORGANIZATIONS SUCH AS CHURCH GROUPS UNIONS, FRATERNAL OR ATHLETIC GROUPS, OR SCHOOL GROUPS?: NO
HOW OFTEN DO YOU HAVE SIX OR MORE DRINKS ON ONE OCCASION: NEVER
WITHIN THE PAST 12 MONTHS, YOU WORRIED THAT YOUR FOOD WOULD RUN OUT BEFORE YOU GOT THE MONEY TO BUY MORE: NEVER TRUE
DO YOU BELONG TO ANY CLUBS OR ORGANIZATIONS SUCH AS CHURCH GROUPS UNIONS, FRATERNAL OR ATHLETIC GROUPS, OR SCHOOL GROUPS?: NO
IN A TYPICAL WEEK, HOW MANY TIMES DO YOU TALK ON THE PHONE WITH FAMILY, FRIENDS, OR NEIGHBORS?: THREE TIMES A WEEK
HOW OFTEN DO YOU ATTENT MEETINGS OF THE CLUB OR ORGANIZATION YOU BELONG TO?: NEVER
HOW OFTEN DO YOU GET TOGETHER WITH FRIENDS OR RELATIVES?: ONCE A WEEK
HOW HARD IS IT FOR YOU TO PAY FOR THE VERY BASICS LIKE FOOD, HOUSING, MEDICAL CARE, AND HEATING?: NOT HARD AT ALL

## 2023-07-22 ASSESSMENT — LIFESTYLE VARIABLES
SKIP TO QUESTIONS 9-10: 1
HOW OFTEN DO YOU HAVE A DRINK CONTAINING ALCOHOL: MONTHLY OR LESS
HOW MANY STANDARD DRINKS CONTAINING ALCOHOL DO YOU HAVE ON A TYPICAL DAY: 1 OR 2
AUDIT-C TOTAL SCORE: 1
HOW OFTEN DO YOU HAVE SIX OR MORE DRINKS ON ONE OCCASION: NEVER

## 2023-07-25 ENCOUNTER — OFFICE VISIT (OUTPATIENT)
Dept: MEDICAL GROUP | Facility: PHYSICIAN GROUP | Age: 68
End: 2023-07-25
Payer: MEDICARE

## 2023-07-25 VITALS
BODY MASS INDEX: 24.69 KG/M2 | DIASTOLIC BLOOD PRESSURE: 72 MMHG | SYSTOLIC BLOOD PRESSURE: 118 MMHG | HEART RATE: 62 BPM | OXYGEN SATURATION: 96 % | TEMPERATURE: 97.7 F | WEIGHT: 148.2 LBS | HEIGHT: 65 IN

## 2023-07-25 DIAGNOSIS — R73.03 PREDIABETES: ICD-10-CM

## 2023-07-25 DIAGNOSIS — Z00.00 MEDICARE ANNUAL WELLNESS VISIT, SUBSEQUENT: Primary | ICD-10-CM

## 2023-07-25 DIAGNOSIS — Z12.31 ENCOUNTER FOR SCREENING MAMMOGRAM FOR BREAST CANCER: ICD-10-CM

## 2023-07-25 DIAGNOSIS — E78.2 MIXED HYPERLIPIDEMIA: ICD-10-CM

## 2023-07-25 PROCEDURE — G0438 PPPS, INITIAL VISIT: HCPCS | Performed by: FAMILY MEDICINE

## 2023-07-25 PROCEDURE — 3078F DIAST BP <80 MM HG: CPT | Performed by: FAMILY MEDICINE

## 2023-07-25 PROCEDURE — 3074F SYST BP LT 130 MM HG: CPT | Performed by: FAMILY MEDICINE

## 2023-07-25 ASSESSMENT — ENCOUNTER SYMPTOMS: GENERAL WELL-BEING: GOOD

## 2023-07-25 ASSESSMENT — ACTIVITIES OF DAILY LIVING (ADL): BATHING_REQUIRES_ASSISTANCE: 0

## 2023-07-25 ASSESSMENT — FIBROSIS 4 INDEX: FIB4 SCORE: 1

## 2023-07-25 ASSESSMENT — PATIENT HEALTH QUESTIONNAIRE - PHQ9: CLINICAL INTERPRETATION OF PHQ2 SCORE: 0

## 2023-07-25 NOTE — PROGRESS NOTES
Chief Complaint   Patient presents with    Medicare Annual Wellness       HPI:  Tara is a 68 y.o. here for Medicare Annual Wellness Visit      Patient Active Problem List    Diagnosis Date Noted    Osteoarthritis of carpometacarpal (CMC) joint of both thumbs 05/09/2023    Mixed hyperlipidemia 06/19/2020    Prediabetes 06/19/2020       Current Outpatient Medications   Medication Sig Dispense Refill    atorvastatin (LIPITOR) 20 MG Tab TAKE 1 TABLET DAILY 90 Tablet 3     No current facility-administered medications for this visit.        Patient is taking medications as noted in medication list.  Current supplements as per medication list.     Allergies: Patient has no known allergies.    Current social contact/activities: walking in neighborhood     Is patient current with immunizations? Yes.    She  reports that she quit smoking about 39 years ago. Her smoking use included cigarettes. She has never used smokeless tobacco. She reports current alcohol use. She reports that she does not use drugs.  Counseling given: Not Answered      ROS:    Gait: Uses no assistive device   Ostomy: No   Other tubes: No   Amputations: No   Chronic oxygen use No   Last eye exam Feb 2023   Wears hearing aids: No   : Reports urinary leakage during the last 6 months that has not interfered at all with their daily activities or sleep.    Screening:    Depression Screening  Little interest or pleasure in doing things?  0 - not at all  Feeling down, depressed, or hopeless? 0 - not at all  Patient Health Questionnaire Score: 0    If depressive symptoms identified deferred to follow up visit unless specifically addressed in assessment and plan.    Interpretation of PHQ-9 Total Score   Score Severity   1-4 No Depression   5-9 Mild Depression   10-14 Moderate Depression   15-19 Moderately Severe Depression   20-27 Severe Depression    Screening for Cognitive Impairment  Three Minute Recall (daughter, heaven, mountain)  3/3    Suman clock face  with all 12 numbers and set the hands to show 10 past 11.  Yes    If cognitive concerns identified, deferred for follow up unless specifically addressed in assessment and plan.    Fall Risk Assessment  Has the patient had two or more falls in the last year or any fall with injury in the last year?  No  If fall risk identified, deferred for follow up unless specifically addressed in assessment and plan.    Safety Assessment  Throw rugs on floor.  Yes  Handrails on all stairs.  Yes  Good lighting in all hallways.  Yes  Difficulty hearing.  No  Patient counseled about all safety risks that were identified.    Functional Assessment ADLs  Are there any barriers preventing you from cooking for yourself or meeting nutritional needs?  No.    Are there any barriers preventing you from driving safely or obtaining transportation?  No.    Are there any barriers preventing you from using a telephone or calling for help?  No.    Are there any barriers preventing you from shopping?  No.    Are there any barriers preventing you from taking care of your own finances?  No.    Are there any barriers preventing you from managing your medications?  No.    Are there any barriers preventing you from showering, bathing or dressing yourself?  No.    Are you currently engaging in any exercise or physical activity?  Yes.  Walking   What is your perception of your health?  Good.    Advance Care Planning  Do you have an Advance Directive, Living Will, Durable Power of , or POLST? Yes  Advance Directive Living Will Durable Power of  POLST is not on file - instructed patient to bring in a copy to scan into their chart    Health Maintenance Summary            Overdue - HEPATITIS C SCREENING (Once) Overdue - never done      No completion history exists for this topic.              Ordered - MAMMOGRAM (Every 2 Years) Ordered on 7/25/2023 09/21/2017  MA-SCREEN MAMMO W/CAD-BILAT              Overdue - COVID-19 Vaccine (4 - Pfizer  series) Overdue since 1/24/2022 11/29/2021  Imm Admin: MODERNA SARS-COV-2 VACCINE (12+)    04/28/2021  Imm Admin: PFIZER PURPLE CAP SARS-COV-2 VACCINATION (12+)    04/06/2021  Imm Admin: PFIZER PURPLE CAP SARS-COV-2 VACCINATION (12+)              Postponed - BONE DENSITY (Every 5 Years) Postponed until 7/25/2024      No completion history exists for this topic.              IMM INFLUENZA (1) Next due on 9/1/2023      10/03/2022  Outside Immunization: Influenza Quad Adjuvanted    09/30/2021  Imm Admin: Influenza, Unspecified - HISTORICAL DATA    08/26/2020  Imm Admin: Influenza Vac Subunit Quad Inj (Pf)    10/07/2019  Imm Admin: Influenza Vaccine Quad Inj (Pf)    10/01/2018  Imm Admin: Influenza Vaccine Quad Inj (Preserved)    Only the first 5 history entries have been loaded, but more history exists.              Annual Wellness Visit (Every 366 Days) Next due on 7/25/2024 07/25/2023  Level of Service: ANNUAL WELLNESS VISIT-INCLUDES PPPS SUBSEQUE*    07/25/2023  Visit Dx: Medicare annual wellness visit, subsequent    04/25/2022  Level of Service: [place] INITIAL ANNUAL WELLNESS VISIT-INCLUDES PPPS    04/25/2022  Visit Dx: Medicare annual wellness visit, initial              COLORECTAL CANCER SCREENING (COLONOSCOPY - Every 10 Years) Next due on 6/19/2025 06/19/2015  COLONOSCOPY (Done)              IMM DTaP/Tdap/Td Vaccine (2 - Td or Tdap) Next due on 6/15/2026      06/15/2016  Imm Admin: Tdap Vaccine              IMM PNEUMOCOCCAL VACCINE: 65+ Years (Series Information) Completed      02/08/2023  Outside Immunization: PCV20    09/30/2021  Imm Admin: Pneumococcal polysaccharide vaccine (PPSV-23)              IMM ZOSTER VACCINES (Series Information) Completed      04/11/2023  Outside Immunization: Zoster Dirk (Shingrix)    02/08/2023  Outside Immunization: Zoster Drik (Shingrix)    06/15/2016  Imm Admin: Zoster Vaccine Live (ZVL) (Zostavax) - HISTORICAL DATA              IMM HEP B VACCINE (Series Information)  "Aged Out      No completion history exists for this topic.              HPV Vaccines (Series Information) Aged Out      No completion history exists for this topic.              IMM MENINGOCOCCAL ACWY VACCINE (Series Information) Aged Out      No completion history exists for this topic.                    Patient Care Team:  Jimmy Miller M.D. as PCP - General (Family Medicine)    Social History     Tobacco Use    Smoking status: Former     Types: Cigarettes     Quit date:      Years since quittin.5    Smokeless tobacco: Never   Vaping Use    Vaping Use: Never used   Substance Use Topics    Alcohol use: Yes     Comment: 1 every 3 months    Drug use: Never     Family History   Problem Relation Age of Onset    Diabetes Mother     Hyperlipidemia Mother     Hyperlipidemia Father      She  has a past medical history of Prediabetes.   History reviewed. No pertinent surgical history.    Exam:   /72 (BP Location: Left arm, Patient Position: Sitting, BP Cuff Size: Adult)   Pulse 62   Temp 36.5 °C (97.7 °F) (Temporal)   Ht 1.651 m (5' 5\")   Wt 67.2 kg (148 lb 3.2 oz)   SpO2 96%  Body mass index is 24.66 kg/m².    Hearing excellent.    Dentition good  Alert, oriented in no acute distress.  Eye contact is good, speech goal directed, affect calm      Assessment and Plan. The following treatment and monitoring plan is recommended:    1. Medicare annual wellness visit, subsequent  Completed.  2. Mixed hyperlipidemia  Chronic, stable condition.  Reviewed labs.  They are stable.  Continue with atorvastatin.  3. Prediabetes  Chronic, stable condition.  A1c of 5.7%.  I recommend continue to reinforce lifestyle change.  4. Encounter for screening mammogram for breast cancer  - MA-SCREENING MAMMO BILAT W/TOMOSYNTHESIS W/CAD; Future       Services suggested: No services needed at this time  Health Care Screening recommendations as per orders if indicated.  Referrals offered: PT/OT/Nutrition " counseling/Behavioral Health/Smoking cessation as per orders if indicated.    Discussion today about general wellness and lifestyle habits:    Prevent falls and reduce trip hazards; Cautioned about securing or removing rugs.  Have a working fire alarm and carbon monoxide detector;   Engage in regular physical activity and social activities     Follow-up: No follow-ups on file.

## 2023-08-15 ENCOUNTER — HOSPITAL ENCOUNTER (OUTPATIENT)
Dept: RADIOLOGY | Facility: MEDICAL CENTER | Age: 68
End: 2023-08-15
Attending: FAMILY MEDICINE
Payer: MEDICARE

## 2023-08-15 DIAGNOSIS — Z12.31 ENCOUNTER FOR SCREENING MAMMOGRAM FOR BREAST CANCER: ICD-10-CM

## 2023-08-15 PROCEDURE — 77063 BREAST TOMOSYNTHESIS BI: CPT

## 2023-09-03 DIAGNOSIS — E78.2 MIXED HYPERLIPIDEMIA: ICD-10-CM

## 2023-09-05 RX ORDER — ATORVASTATIN CALCIUM 20 MG/1
TABLET, FILM COATED ORAL
Qty: 90 TABLET | Refills: 0 | Status: SHIPPED | OUTPATIENT
Start: 2023-09-05 | End: 2023-11-27 | Stop reason: SDUPTHER

## 2023-11-07 ENCOUNTER — OFFICE VISIT (OUTPATIENT)
Dept: MEDICAL GROUP | Facility: PHYSICIAN GROUP | Age: 68
End: 2023-11-07
Payer: MEDICARE

## 2023-11-07 VITALS
BODY MASS INDEX: 25.86 KG/M2 | WEIGHT: 155.2 LBS | DIASTOLIC BLOOD PRESSURE: 80 MMHG | HEART RATE: 77 BPM | HEIGHT: 65 IN | OXYGEN SATURATION: 96 % | TEMPERATURE: 98.3 F | SYSTOLIC BLOOD PRESSURE: 110 MMHG

## 2023-11-07 DIAGNOSIS — Z13.29 SCREENING FOR THYROID DISORDER: ICD-10-CM

## 2023-11-07 DIAGNOSIS — E78.2 MIXED HYPERLIPIDEMIA: ICD-10-CM

## 2023-11-07 DIAGNOSIS — R73.03 PREDIABETES: ICD-10-CM

## 2023-11-07 PROCEDURE — 3074F SYST BP LT 130 MM HG: CPT

## 2023-11-07 PROCEDURE — 99214 OFFICE O/P EST MOD 30 MIN: CPT

## 2023-11-07 PROCEDURE — 3079F DIAST BP 80-89 MM HG: CPT

## 2023-11-07 ASSESSMENT — ENCOUNTER SYMPTOMS
HEADACHES: 0
VOMITING: 0
ABDOMINAL PAIN: 0
DIZZINESS: 0
NAUSEA: 0
DIARRHEA: 0
SHORTNESS OF BREATH: 0
COUGH: 0
MYALGIAS: 0
FEVER: 0
WEIGHT LOSS: 0
BLURRED VISION: 0
CHILLS: 0
CONSTIPATION: 0
WEAKNESS: 0

## 2023-11-07 ASSESSMENT — FIBROSIS 4 INDEX: FIB4 SCORE: 1

## 2023-11-07 NOTE — ASSESSMENT & PLAN NOTE
Chronic condition  -Exercise: At least 150 minutes of moderate aerobic activity per week or 75 minutes of vigorous aerobic activity per week, +2 days/week of strength training  - Healthy lifestyle and eating habits: Mediterranean-based diet (rich in fruits, vegetables, nuts and healthy oils), proper hydration and avoiding sugary beverages, adequate sleep hygiene-(allowing 7 to 8 hours of overnight sleep).

## 2023-11-07 NOTE — PROGRESS NOTES
"Subjective:     CC:  Diagnoses of Mixed hyperlipidemia, Prediabetes, and Screening for thyroid disorder were pertinent to this visit.    HISTORY OF THE PRESENT ILLNESS: Patient is a 68 y.o. female. This pleasant patient is here today to establish care and discuss the following problems:    Problem   Mixed Hyperlipidemia    Chronic condition  Taking atorvastatin 20 mg daily  Eating healthy  Walking 3-4 miles per day     Prediabetes    Chronic condition  Lab Results   Component Value Date/Time    HBA1C 5.7 (H) 07/13/2023 07:08 AM    HBA1C 5.5 04/25/2022 03:49 PM              Health Maintenance: Completed    ROS:   Review of Systems   Constitutional:  Negative for chills, fever, malaise/fatigue and weight loss.   Eyes:  Negative for blurred vision.   Respiratory:  Negative for cough and shortness of breath.    Cardiovascular:  Negative for chest pain.   Gastrointestinal:  Negative for abdominal pain, constipation, diarrhea, nausea and vomiting.   Musculoskeletal:  Negative for myalgias.   Neurological:  Negative for dizziness, weakness and headaches.         Objective:     Exam: /80 (BP Location: Left arm, Patient Position: Sitting, BP Cuff Size: Adult)   Pulse 77   Temp 36.8 °C (98.3 °F)   Ht 1.651 m (5' 5\")   Wt 70.4 kg (155 lb 3.2 oz)   SpO2 96%  Body mass index is 25.83 kg/m².    Physical Exam  Constitutional:       General: She is not in acute distress.     Appearance: Normal appearance. She is not ill-appearing or toxic-appearing.   HENT:      Head: Normocephalic.   Eyes:      Conjunctiva/sclera: Conjunctivae normal.   Cardiovascular:      Rate and Rhythm: Normal rate and regular rhythm.      Pulses: Normal pulses.      Heart sounds: Normal heart sounds. No murmur heard.  Pulmonary:      Effort: Pulmonary effort is normal. No respiratory distress.      Breath sounds: Normal breath sounds.   Skin:     General: Skin is warm and dry.   Neurological:      General: No focal deficit present.      Mental " "Status: She is alert and oriented to person, place, and time.   Psychiatric:         Mood and Affect: Mood normal.         Behavior: Behavior normal.           Labs:   Lab Results   Component Value Date/Time    CHOLSTRLTOT 148 07/13/2023 07:08 AM    LDL 71 07/13/2023 07:08 AM    HDL 55 07/13/2023 07:08 AM    TRIGLYCERIDE 112 07/13/2023 07:08 AM       Lab Results   Component Value Date/Time    SODIUM 144 07/13/2023 07:08 AM    POTASSIUM 4.1 07/13/2023 07:08 AM    CHLORIDE 106 07/13/2023 07:08 AM    CO2 28 07/13/2023 07:08 AM    GLUCOSE 112 (H) 07/13/2023 07:08 AM    BUN 13 07/13/2023 07:08 AM    CREATININE 0.80 07/13/2023 07:08 AM     Lab Results   Component Value Date/Time    ALKPHOSPHAT 67 07/13/2023 07:08 AM    ASTSGOT 19 07/13/2023 07:08 AM    ALTSGPT 22 07/13/2023 07:08 AM    TBILIRUBIN 0.3 07/13/2023 07:08 AM      Lab Results   Component Value Date/Time    HBA1C 5.7 (H) 07/13/2023 07:08 AM    HBA1C 5.5 04/25/2022 03:49 PM    HBA1C 6.0 (H) 07/09/2021 10:24 AM     No results found for: \"TSH\"  No results found for: \"FREET4\"  No results found for: \"CBC\"      Assessment & Plan: Medical Decision Making   68 y.o. female with the following -    Problem List Items Addressed This Visit       Mixed hyperlipidemia     Chronic condition stable therefore we will continue current regimen of atorvastatin 20 mg daily and encouragement of healthy lifestyle regiments  -Lipid panel ordered for next lab draw         Relevant Orders    HEMOGLOBIN A1C    Comp Metabolic Panel    Lipid Profile    Prediabetes     Chronic condition  -Exercise: At least 150 minutes of moderate aerobic activity per week or 75 minutes of vigorous aerobic activity per week, +2 days/week of strength training  - Healthy lifestyle and eating habits: Mediterranean-based diet (rich in fruits, vegetables, nuts and healthy oils), proper hydration and avoiding sugary beverages, adequate sleep hygiene-(allowing 7 to 8 hours of overnight sleep).           Relevant " Orders    HEMOGLOBIN A1C    Comp Metabolic Panel    Lipid Profile     Other Visit Diagnoses       Screening for thyroid disorder        Relevant Orders    TSH WITH REFLEX TO FT4            Differential diagnosis, natural history, supportive care, and indications for immediate follow-up discussed.  Shared decision making approach was utilized, and patient is amendable with plan of care.  Patient understands to return to clinic or go to the emergency department if symptoms worsen. All questions and concerns addressed to the best of my knowledge.      Return in about 6 months (around 5/7/2024).    Please note that this dictation was created using voice recognition software. I have made every reasonable attempt to correct obvious errors, but I expect that there are errors of grammar and possibly content that I did not discover before finalizing the note.

## 2023-11-07 NOTE — ASSESSMENT & PLAN NOTE
Chronic condition stable therefore we will continue current regimen of atorvastatin 20 mg daily and encouragement of healthy lifestyle regiments  -Lipid panel ordered for next lab draw

## 2023-11-27 DIAGNOSIS — E78.2 MIXED HYPERLIPIDEMIA: ICD-10-CM

## 2023-11-27 RX ORDER — ATORVASTATIN CALCIUM 20 MG/1
20 TABLET, FILM COATED ORAL DAILY
Qty: 90 TABLET | Refills: 3 | Status: SHIPPED | OUTPATIENT
Start: 2023-11-27 | End: 2023-12-04 | Stop reason: SDUPTHER

## 2023-12-04 ENCOUNTER — PATIENT MESSAGE (OUTPATIENT)
Dept: MEDICAL GROUP | Facility: PHYSICIAN GROUP | Age: 68
End: 2023-12-04
Payer: MEDICARE

## 2023-12-04 DIAGNOSIS — E78.2 MIXED HYPERLIPIDEMIA: ICD-10-CM

## 2023-12-05 RX ORDER — ATORVASTATIN CALCIUM 20 MG/1
20 TABLET, FILM COATED ORAL DAILY
Qty: 90 TABLET | Refills: 3 | Status: SHIPPED | OUTPATIENT
Start: 2023-12-05

## 2024-03-27 ENCOUNTER — APPOINTMENT (OUTPATIENT)
Dept: MEDICAL GROUP | Facility: PHYSICIAN GROUP | Age: 69
End: 2024-03-27
Payer: MEDICARE

## 2024-05-02 ENCOUNTER — HOSPITAL ENCOUNTER (OUTPATIENT)
Dept: LAB | Facility: MEDICAL CENTER | Age: 69
End: 2024-05-02
Payer: MEDICARE

## 2024-05-02 DIAGNOSIS — E78.2 MIXED HYPERLIPIDEMIA: ICD-10-CM

## 2024-05-02 DIAGNOSIS — Z13.29 SCREENING FOR THYROID DISORDER: ICD-10-CM

## 2024-05-02 DIAGNOSIS — R73.03 PREDIABETES: ICD-10-CM

## 2024-05-02 LAB
EST. AVERAGE GLUCOSE BLD GHB EST-MCNC: 120 MG/DL
HBA1C MFR BLD: 5.8 % (ref 4–5.6)

## 2024-05-03 LAB
ALBUMIN SERPL BCP-MCNC: 4.6 G/DL (ref 3.2–4.9)
ALBUMIN/GLOB SERPL: 1.5 G/DL
ALP SERPL-CCNC: 73 U/L (ref 30–99)
ALT SERPL-CCNC: 11 U/L (ref 2–50)
ANION GAP SERPL CALC-SCNC: 12 MMOL/L (ref 7–16)
AST SERPL-CCNC: 19 U/L (ref 12–45)
BILIRUB SERPL-MCNC: 0.5 MG/DL (ref 0.1–1.5)
BUN SERPL-MCNC: 14 MG/DL (ref 8–22)
CALCIUM ALBUM COR SERPL-MCNC: 9.2 MG/DL (ref 8.5–10.5)
CALCIUM SERPL-MCNC: 9.7 MG/DL (ref 8.5–10.5)
CHLORIDE SERPL-SCNC: 105 MMOL/L (ref 96–112)
CHOLEST SERPL-MCNC: 148 MG/DL (ref 100–199)
CO2 SERPL-SCNC: 26 MMOL/L (ref 20–33)
CREAT SERPL-MCNC: 0.54 MG/DL (ref 0.5–1.4)
FASTING STATUS PATIENT QL REPORTED: NORMAL
GFR SERPLBLD CREATININE-BSD FMLA CKD-EPI: 100 ML/MIN/1.73 M 2
GLOBULIN SER CALC-MCNC: 3.1 G/DL (ref 1.9–3.5)
GLUCOSE SERPL-MCNC: 100 MG/DL (ref 65–99)
HDLC SERPL-MCNC: 61 MG/DL
LDLC SERPL CALC-MCNC: 72 MG/DL
POTASSIUM SERPL-SCNC: 4.3 MMOL/L (ref 3.6–5.5)
PROT SERPL-MCNC: 7.7 G/DL (ref 6–8.2)
SODIUM SERPL-SCNC: 143 MMOL/L (ref 135–145)
TRIGL SERPL-MCNC: 76 MG/DL (ref 0–149)
TSH SERPL DL<=0.005 MIU/L-ACNC: 1.53 UIU/ML (ref 0.38–5.33)

## 2024-05-07 ENCOUNTER — APPOINTMENT (OUTPATIENT)
Dept: MEDICAL GROUP | Facility: PHYSICIAN GROUP | Age: 69
End: 2024-05-07
Payer: MEDICARE

## 2024-05-07 VITALS
BODY MASS INDEX: 24.65 KG/M2 | SYSTOLIC BLOOD PRESSURE: 118 MMHG | HEIGHT: 64 IN | WEIGHT: 144.4 LBS | OXYGEN SATURATION: 98 % | TEMPERATURE: 98.4 F | DIASTOLIC BLOOD PRESSURE: 80 MMHG | HEART RATE: 71 BPM

## 2024-05-07 DIAGNOSIS — E78.2 MIXED HYPERLIPIDEMIA: ICD-10-CM

## 2024-05-07 DIAGNOSIS — R73.03 PREDIABETES: ICD-10-CM

## 2024-05-07 PROCEDURE — 3079F DIAST BP 80-89 MM HG: CPT

## 2024-05-07 PROCEDURE — 99214 OFFICE O/P EST MOD 30 MIN: CPT

## 2024-05-07 PROCEDURE — 3074F SYST BP LT 130 MM HG: CPT

## 2024-05-07 ASSESSMENT — ENCOUNTER SYMPTOMS
WEIGHT LOSS: 0
WEAKNESS: 0
COUGH: 0
ABDOMINAL PAIN: 0
CHILLS: 0
BLURRED VISION: 0
FEVER: 0
MYALGIAS: 0
CONSTIPATION: 0
HEADACHES: 0
VOMITING: 0
DIARRHEA: 0
NAUSEA: 0
SHORTNESS OF BREATH: 0
DIZZINESS: 0

## 2024-05-07 ASSESSMENT — PATIENT HEALTH QUESTIONNAIRE - PHQ9: CLINICAL INTERPRETATION OF PHQ2 SCORE: 0

## 2024-05-07 ASSESSMENT — FIBROSIS 4 INDEX: FIB4 SCORE: 1.44

## 2024-05-07 NOTE — PROGRESS NOTES
"Verbal consent was acquired by the patient to use [employer] ambient listening note generation during this visit  Subjective:     CC: Lab review follow-up    HPI:   Tara presents today with  History of Present Illness  The patient presents for evaluation of multiple medical concerns. She is accompanied by an adult male.    The patient reports experiencing mild fatigue. She maintains an active lifestyle, walking 3 to 4 miles daily.  She is taking multivitamin daily as well as atorvastatin 20 mg daily for hyperlipidemia.    The patient is diagnosed with severe osteoarthritis in her left hip, which is bone-on-bone. She is scheduled for a left hip replacement in either 09/2024 or 10/2024. She has previously consulted with Dr. Jarod Cosme. She has previously received a cortisone injection in her knee and hip however, she did not feel the hip injection was ultimately helpful.      No problems updated.      ROS:  Review of Systems   Constitutional:  Negative for chills, fever, malaise/fatigue and weight loss.   Eyes:  Negative for blurred vision.   Respiratory:  Negative for cough and shortness of breath.    Cardiovascular:  Negative for chest pain.   Gastrointestinal:  Negative for abdominal pain, constipation, diarrhea, nausea and vomiting.   Musculoskeletal:  Positive for joint pain. Negative for myalgias.   Neurological:  Negative for dizziness, weakness and headaches.       Objective:     Exam:  /80 (BP Location: Left arm, Patient Position: Sitting, BP Cuff Size: Adult)   Pulse 71   Temp 36.9 °C (98.4 °F) (Temporal)   Ht 1.626 m (5' 4\")   Wt 65.5 kg (144 lb 6.4 oz)   SpO2 98%   BMI 24.79 kg/m²  Body mass index is 24.79 kg/m².    Physical Exam  Constitutional:       General: She is not in acute distress.     Appearance: Normal appearance. She is not ill-appearing or toxic-appearing.   HENT:      Head: Normocephalic.   Eyes:      Conjunctiva/sclera: Conjunctivae normal.   Cardiovascular:      Rate and " "Rhythm: Normal rate and regular rhythm.      Pulses: Normal pulses.      Heart sounds: Normal heart sounds. No murmur heard.  Pulmonary:      Effort: Pulmonary effort is normal. No respiratory distress.      Breath sounds: Normal breath sounds.   Skin:     General: Skin is warm and dry.   Neurological:      General: No focal deficit present.      Mental Status: She is alert and oriented to person, place, and time.   Psychiatric:         Mood and Affect: Mood normal.         Behavior: Behavior normal.         Labs:   Lab Results   Component Value Date/Time    CHOLSTRLTOT 148 05/02/2024 08:31 AM    LDL 72 05/02/2024 08:31 AM    HDL 61 05/02/2024 08:31 AM    TRIGLYCERIDE 76 05/02/2024 08:31 AM       Lab Results   Component Value Date/Time    SODIUM 143 05/02/2024 08:31 AM    POTASSIUM 4.3 05/02/2024 08:31 AM    CHLORIDE 105 05/02/2024 08:31 AM    CO2 26 05/02/2024 08:31 AM    GLUCOSE 100 (H) 05/02/2024 08:31 AM    BUN 14 05/02/2024 08:31 AM    CREATININE 0.54 05/02/2024 08:31 AM     Lab Results   Component Value Date/Time    ALKPHOSPHAT 73 05/02/2024 08:31 AM    ASTSGOT 19 05/02/2024 08:31 AM    ALTSGPT 11 05/02/2024 08:31 AM    TBILIRUBIN 0.5 05/02/2024 08:31 AM      Lab Results   Component Value Date/Time    HBA1C 5.8 (H) 05/02/2024 08:31 AM    HBA1C 5.7 (H) 07/13/2023 07:08 AM    HBA1C 5.5 04/25/2022 03:49 PM     No results found for: \"TSH\"  No results found for: \"FREET4\"  No results found for: \"CBC\"      Assessment & Plan: Medical Decision Making     69 y.o. female with the following -   Assessment & Plan  1. Osteoarthritis of the left hip.  The patient is slated for a left hip replacement in the forthcoming months.    2.  Prediabetes  The patient's fasting glucose levels are within the prediabetic range at 5.8%. The patient is advised to maintain a healthy diet and engage in regular physical activity.    3. Hyperlipidemia.  The cholesterol levels are well-managed with the current medication regimen. The patient " will maintain her current regimen of atorvastatin 20 mg.    4. Postmenopausal status.  The patient is in the postmenopausal range. The patient is advised to take vitamin D3 2000 IUs daily.  Advised to get adequate calcium daily at least 1200 mg from dietary sources preferred.  Recommend engaging in strength training exercises for prevention of osteoporosis    Follow-up  The patient is scheduled for a follow-up visit in 1 year.    Problem List Items Addressed This Visit       Mixed hyperlipidemia    Relevant Orders    HEMOGLOBIN A1C    Comp Metabolic Panel    Lipid Profile    Prediabetes    Relevant Orders    HEMOGLOBIN A1C    Comp Metabolic Panel    Lipid Profile       Differential diagnosis, natural history, supportive care, and indications for immediate follow-up discussed.  Shared decision making approach utilized, and patient is amendable with plan of care.  Patient understands to return to clinic or go to the emergency department if symptoms worsen. All questions and concerns addressed to the best of my knowledge.    Return in about 1 year (around 5/7/2025), or if symptoms worsen or fail to improve.    Please note that this dictation was created using voice recognition software. I have made every reasonable attempt to correct obvious errors, but I expect that there are errors of grammar and possibly content that I did not discover before finalizing the note.

## 2024-05-30 PROBLEM — M16.12 OSTEOARTHRITIS OF LEFT HIP: Status: ACTIVE | Noted: 2024-05-30

## 2025-01-02 DIAGNOSIS — E78.2 MIXED HYPERLIPIDEMIA: ICD-10-CM

## 2025-01-02 RX ORDER — ATORVASTATIN CALCIUM 20 MG/1
20 TABLET, FILM COATED ORAL DAILY
Qty: 90 TABLET | Refills: 1 | Status: SHIPPED | OUTPATIENT
Start: 2025-01-02

## 2025-01-02 RX ORDER — ASPIRIN 81 MG/1
81 TABLET, CHEWABLE ORAL 2 TIMES DAILY WITH MEALS
Qty: 84 TABLET | Refills: 1 | Status: SHIPPED | OUTPATIENT
Start: 2025-01-02

## 2025-01-02 NOTE — TELEPHONE ENCOUNTER
Received request via: Patient    Was the patient seen in the last year in this department? Yes    Does the patient have an active prescription (recently filled or refills available) for medication(s) requested? No    Pharmacy Name: [facility]    Does the patient have FDC Plus and need 100-day supply? (This applies to ALL medications) Patient does not have SCP

## 2025-04-08 ENCOUNTER — HOSPITAL ENCOUNTER (OUTPATIENT)
Dept: LAB | Facility: MEDICAL CENTER | Age: 70
End: 2025-04-08
Payer: MEDICARE

## 2025-04-08 DIAGNOSIS — E78.2 MIXED HYPERLIPIDEMIA: ICD-10-CM

## 2025-04-08 DIAGNOSIS — R73.03 PREDIABETES: ICD-10-CM

## 2025-04-08 LAB
ALBUMIN SERPL BCP-MCNC: 4.3 G/DL (ref 3.2–4.9)
ALBUMIN/GLOB SERPL: 1.4 G/DL
ALP SERPL-CCNC: 74 U/L (ref 30–99)
ALT SERPL-CCNC: 20 U/L (ref 2–50)
ANION GAP SERPL CALC-SCNC: 10 MMOL/L (ref 7–16)
AST SERPL-CCNC: 24 U/L (ref 12–45)
BILIRUB SERPL-MCNC: 0.5 MG/DL (ref 0.1–1.5)
BUN SERPL-MCNC: 12 MG/DL (ref 8–22)
CALCIUM ALBUM COR SERPL-MCNC: 9.5 MG/DL (ref 8.5–10.5)
CALCIUM SERPL-MCNC: 9.7 MG/DL (ref 8.5–10.5)
CHLORIDE SERPL-SCNC: 105 MMOL/L (ref 96–112)
CHOLEST SERPL-MCNC: 153 MG/DL (ref 100–199)
CO2 SERPL-SCNC: 26 MMOL/L (ref 20–33)
CREAT SERPL-MCNC: 0.78 MG/DL (ref 0.5–1.4)
EST. AVERAGE GLUCOSE BLD GHB EST-MCNC: 117 MG/DL
FASTING STATUS PATIENT QL REPORTED: NORMAL
GFR SERPLBLD CREATININE-BSD FMLA CKD-EPI: 82 ML/MIN/1.73 M 2
GLOBULIN SER CALC-MCNC: 3 G/DL (ref 1.9–3.5)
GLUCOSE SERPL-MCNC: 112 MG/DL (ref 65–99)
HBA1C MFR BLD: 5.7 % (ref 4–5.6)
HDLC SERPL-MCNC: 63 MG/DL
LDLC SERPL CALC-MCNC: 70 MG/DL
POTASSIUM SERPL-SCNC: 4.3 MMOL/L (ref 3.6–5.5)
PROT SERPL-MCNC: 7.3 G/DL (ref 6–8.2)
SODIUM SERPL-SCNC: 141 MMOL/L (ref 135–145)
TRIGL SERPL-MCNC: 100 MG/DL (ref 0–149)

## 2025-04-08 PROCEDURE — 80053 COMPREHEN METABOLIC PANEL: CPT

## 2025-04-08 PROCEDURE — 36415 COLL VENOUS BLD VENIPUNCTURE: CPT

## 2025-04-08 PROCEDURE — 80061 LIPID PANEL: CPT

## 2025-04-08 PROCEDURE — 83036 HEMOGLOBIN GLYCOSYLATED A1C: CPT | Mod: GA

## 2025-04-09 ENCOUNTER — RESULTS FOLLOW-UP (OUTPATIENT)
Dept: MEDICAL GROUP | Facility: PHYSICIAN GROUP | Age: 70
End: 2025-04-09

## 2025-04-14 ENCOUNTER — OFFICE VISIT (OUTPATIENT)
Dept: MEDICAL GROUP | Facility: PHYSICIAN GROUP | Age: 70
End: 2025-04-14
Payer: MEDICARE

## 2025-04-14 VITALS
SYSTOLIC BLOOD PRESSURE: 90 MMHG | DIASTOLIC BLOOD PRESSURE: 60 MMHG | TEMPERATURE: 97.8 F | OXYGEN SATURATION: 97 % | BODY MASS INDEX: 25.99 KG/M2 | WEIGHT: 152.2 LBS | HEART RATE: 71 BPM | HEIGHT: 64 IN

## 2025-04-14 DIAGNOSIS — Z12.31 ENCOUNTER FOR SCREENING MAMMOGRAM FOR MALIGNANT NEOPLASM OF BREAST: ICD-10-CM

## 2025-04-14 DIAGNOSIS — E78.2 MIXED HYPERLIPIDEMIA: ICD-10-CM

## 2025-04-14 DIAGNOSIS — Z00.00 HEALTHCARE MAINTENANCE: ICD-10-CM

## 2025-04-14 DIAGNOSIS — R73.03 PREDIABETES: ICD-10-CM

## 2025-04-14 PROBLEM — Z96.642 S/P TOTAL LEFT HIP ARTHROPLASTY: Status: ACTIVE | Noted: 2024-05-30

## 2025-04-14 PROCEDURE — 3078F DIAST BP <80 MM HG: CPT

## 2025-04-14 PROCEDURE — 3074F SYST BP LT 130 MM HG: CPT

## 2025-04-14 PROCEDURE — 99214 OFFICE O/P EST MOD 30 MIN: CPT

## 2025-04-14 ASSESSMENT — FIBROSIS 4 INDEX: FIB4 SCORE: 1.35

## 2025-04-14 ASSESSMENT — ENCOUNTER SYMPTOMS
HEADACHES: 0
MYALGIAS: 0
CHILLS: 0
SHORTNESS OF BREATH: 0
CONSTIPATION: 0
DIARRHEA: 0
VOMITING: 0
NAUSEA: 0
COUGH: 0
ABDOMINAL PAIN: 0
WEIGHT LOSS: 0
DIZZINESS: 0
FEVER: 0
BLURRED VISION: 0
WEAKNESS: 0

## 2025-04-14 ASSESSMENT — PATIENT HEALTH QUESTIONNAIRE - PHQ9: CLINICAL INTERPRETATION OF PHQ2 SCORE: 0

## 2025-04-14 NOTE — PROGRESS NOTES
Verbal consent was acquired by the patient to use [employer] ambient listening note generation during this visit  Subjective:     CC:  Diagnoses of Encounter for screening mammogram for malignant neoplasm of breast, Healthcare maintenance, Mixed hyperlipidemia, and Prediabetes were pertinent to this visit.    HISTORY OF THE PRESENT ILLNESS: Patient is a 69 y.o. female.   Problem   S/P Total Left Hip Arthroplasty   Mixed Hyperlipidemia    Chronic condition  Taking atorvastatin 20 mg daily  Eating healthy  Walking 3-4 miles per day     Prediabetes    Chronic condition  Lab Results   Component Value Date/Time    HBA1C 5.7 (H) 04/08/2025 07:13 AM    HBA1C 5.8 (H) 05/02/2024 08:31 AM              History of Present Illness  The patient presents for a health maintenance visit.    General Well-being  - Reports a general sense of well-being  - Has not been taking aspirin  - Current regimen includes multivitamins and vitamin D3 supplements  - Maintains an active lifestyle with weight-bearing exercises and adequate sun exposure  - Expressed interest in undergoing a mammogram  - Prefers to defer the bone mineral density test at this time  - History of childbirth  - Believes dietary intake of calcium is sufficient  - Recalls past incident where additional calcium supplementation resulted in adverse effects    Left Hip Arthroplasty  - Underwent successful left hip arthroplasty in August 2024, performed by Dr. Jarod Cosme  - Experienced significant improvement in mobility postoperatively  - Able to ambulate on the same day of surgery  - Within a week, able to walk around the block  - By the third week, achieved a distance of 7 miles  - Reports no current pain or limb length discrepancy  - Prior to surgery, experienced severe pain that significantly limited mobility  - Acknowledges the need for regular exercise to strengthen quadriceps muscle    Supplemental information: None    FAMILY HISTORY  Her parents were  "prediabetic.    MEDICATIONS  atorvastatin, multivitamin, vitamin D3    ROS:   Review of Systems   Constitutional:  Negative for chills, fever, malaise/fatigue and weight loss.   Eyes:  Negative for blurred vision.   Respiratory:  Negative for cough and shortness of breath.    Cardiovascular:  Negative for chest pain.   Gastrointestinal:  Negative for abdominal pain, constipation, diarrhea, nausea and vomiting.   Musculoskeletal:  Negative for myalgias.   Neurological:  Negative for dizziness, weakness and headaches.         Objective:     Exam: BP 90/60 (BP Location: Left arm, Patient Position: Sitting, BP Cuff Size: Adult)   Pulse 71   Temp 36.6 °C (97.8 °F) (Temporal)   Ht 1.626 m (5' 4\")   Wt 69 kg (152 lb 3.2 oz)   SpO2 97%  Body mass index is 26.13 kg/m².    Physical Exam  Constitutional:       General: She is not in acute distress.     Appearance: Normal appearance. She is not ill-appearing or toxic-appearing.   HENT:      Head: Normocephalic.   Eyes:      Conjunctiva/sclera: Conjunctivae normal.   Cardiovascular:      Rate and Rhythm: Normal rate and regular rhythm.      Pulses: Normal pulses.      Heart sounds: Normal heart sounds. No murmur heard.  Pulmonary:      Effort: Pulmonary effort is normal. No respiratory distress.      Breath sounds: Normal breath sounds.   Skin:     General: Skin is warm and dry.   Neurological:      General: No focal deficit present.      Mental Status: She is alert and oriented to person, place, and time.   Psychiatric:         Mood and Affect: Mood normal.         Behavior: Behavior normal.           Labs:   Hospital Outpatient Visit on 04/08/2025   Component Date Value    Glycohemoglobin 04/08/2025 5.7 (H)     Est Avg Glucose 04/08/2025 117     Sodium 04/08/2025 141     Potassium 04/08/2025 4.3     Chloride 04/08/2025 105     Co2 04/08/2025 26     Anion Gap 04/08/2025 10.0     Glucose 04/08/2025 112 (H)     Bun 04/08/2025 12     Creatinine 04/08/2025 0.78     Calcium " 04/08/2025 9.7     Correct Calcium 04/08/2025 9.5     AST(SGOT) 04/08/2025 24     ALT(SGPT) 04/08/2025 20     Alkaline Phosphatase 04/08/2025 74     Total Bilirubin 04/08/2025 0.5     Albumin 04/08/2025 4.3     Total Protein 04/08/2025 7.3     Globulin 04/08/2025 3.0     A-G Ratio 04/08/2025 1.4     Cholesterol,Tot 04/08/2025 153     Triglycerides 04/08/2025 100     HDL 04/08/2025 63     LDL 04/08/2025 70     Fasting Status 04/08/2025 Fasting     GFR (CKD-EPI) 04/08/2025 82          Assessment & Plan: Medical Decision Making   69 y.o. female with the following -    Assessment & Plan  1. Health Maintenance.  Her metabolic panel results are within normal limits. Fasting glucose is slightly elevated at 112, and A1c has improved to 5.7% from 5.8%, indicating a borderline prediabetic state. Cholesterol levels are optimal. Thyroid function was assessed in 05/2024 and found to be normal. She is not due for hepatitis C screening. She will continue her current regimen of atorvastatin 20 mg daily and multivitamins. A mammogram for breast cancer screening will be ordered. She is advised to maintain a balanced diet rich in leafy greens and dairy products. Vitamin D levels will be checked during the next lab visit, and she is advised to continue her vitamin D supplementation. A CBC and repeat A1c will be conducted during the next lab visit.    2. Status post left hip arthroplasty.  She underwent left hip arthroplasty in 08/2024, performed by Dr. Jarod Cosme. She reports significant improvement in pain and mobility post-surgery, with no current pain or joint shortening. She is advised to continue weight-bearing exercises to strengthen the quadriceps muscle.    3.  Prediabetes  Chronic stable condition with A1c at 5.7% therefore we will continue healthy lifestyle as discussed.  Mediterranean diet and getting at least 250 minutes of activity weekly promoted.    4.  Dyslipidemia  Chronic condition stable therefore recommend  continuation of atorvastatin 20 mg daily    PROCEDURE  The patient underwent a successful left hip arthroplasty in 08/2024, performed by Dr. Jarod Cosme.      Problem List Items Addressed This Visit       Mixed hyperlipidemia    .         Prediabetes    .         Relevant Orders    HEMOGLOBIN A1C     Other Visit Diagnoses         Encounter for screening mammogram for malignant neoplasm of breast        Relevant Orders    MA-SCREENING MAMMO BILAT W/TOMOSYNTHESIS W/CAD      Healthcare maintenance        Relevant Orders    CBC WITH DIFFERENTIAL    HEMOGLOBIN A1C            Differential diagnosis, natural history, supportive care, and indications for immediate follow-up discussed.  Shared decision making approach was utilized, and patient is amendable with plan of care.  Patient understands to return to clinic or go to the emergency department if symptoms worsen. All questions and concerns addressed to the best of my knowledge.      Return if symptoms worsen or fail to improve.    Please note that this dictation was created using voice recognition software. I have made every reasonable attempt to correct obvious errors, but I expect that there are errors of grammar and possibly content that I did not discover before finalizing the note.

## 2025-05-07 ENCOUNTER — HOSPITAL ENCOUNTER (OUTPATIENT)
Dept: RADIOLOGY | Facility: MEDICAL CENTER | Age: 70
End: 2025-05-07
Payer: MEDICARE

## 2025-05-07 ENCOUNTER — RESULTS FOLLOW-UP (OUTPATIENT)
Dept: MEDICAL GROUP | Facility: PHYSICIAN GROUP | Age: 70
End: 2025-05-07

## 2025-05-07 DIAGNOSIS — Z12.31 ENCOUNTER FOR SCREENING MAMMOGRAM FOR MALIGNANT NEOPLASM OF BREAST: ICD-10-CM

## 2025-05-07 PROCEDURE — 77067 SCR MAMMO BI INCL CAD: CPT

## 2025-06-15 DIAGNOSIS — E78.2 MIXED HYPERLIPIDEMIA: ICD-10-CM

## 2025-06-16 RX ORDER — ATORVASTATIN CALCIUM 20 MG/1
20 TABLET, FILM COATED ORAL DAILY
Qty: 90 TABLET | Refills: 3 | Status: SHIPPED | OUTPATIENT
Start: 2025-06-16

## 2025-06-16 NOTE — TELEPHONE ENCOUNTER
Received request via: Pharmacy    Was the patient seen in the last year in this department? Yes    Does the patient have an active prescription (recently filled or refills available) for medication(s) requested? No    Pharmacy Name: [facility]    Does the patient have group home Plus and need 100-day supply? (This applies to ALL medications) Patient does not have SCP